# Patient Record
Sex: FEMALE | Race: OTHER | HISPANIC OR LATINO | URBAN - METROPOLITAN AREA
[De-identification: names, ages, dates, MRNs, and addresses within clinical notes are randomized per-mention and may not be internally consistent; named-entity substitution may affect disease eponyms.]

---

## 2022-06-10 ENCOUNTER — TELEPHONE (OUTPATIENT)
Dept: FAMILY MEDICINE CLINIC | Facility: CLINIC | Age: 52
End: 2022-06-10

## 2022-06-27 NOTE — TELEPHONE ENCOUNTER
06/27/22 2:51 PM        Thank you for your request  Your request has been received, reviewed, and the patient chart updated  The PCP has successfully been removed with a patient attribution note  This message will now be completed          Thank you  Ciara Mckeon

## 2022-06-30 ENCOUNTER — APPOINTMENT (OUTPATIENT)
Dept: URGENT CARE | Age: 52
End: 2022-06-30

## 2022-12-31 ENCOUNTER — HOSPITAL ENCOUNTER (EMERGENCY)
Facility: HOSPITAL | Age: 52
Discharge: HOME | End: 2023-01-01
Attending: EMERGENCY MEDICINE

## 2022-12-31 VITALS
DIASTOLIC BLOOD PRESSURE: 77 MMHG | HEART RATE: 108 BPM | WEIGHT: 172 LBS | TEMPERATURE: 99.6 F | SYSTOLIC BLOOD PRESSURE: 125 MMHG | BODY MASS INDEX: 34.74 KG/M2 | OXYGEN SATURATION: 97 % | RESPIRATION RATE: 18 BRPM

## 2022-12-31 DIAGNOSIS — R11.10 VOMITING: ICD-10-CM

## 2022-12-31 DIAGNOSIS — R10.13 EPIGASTRIC PAIN: Primary | ICD-10-CM

## 2022-12-31 LAB
ALBUMIN SERPL BCP-MCNC: 3.6 G/DL (ref 3.5–5)
ALP SERPL-CCNC: 87 U/L (ref 46–116)
ALT SERPL W P-5'-P-CCNC: 35 U/L (ref 12–78)
ANION GAP SERPL CALCULATED.3IONS-SCNC: 7 MMOL/L (ref 4–13)
AST SERPL W P-5'-P-CCNC: 17 U/L (ref 5–45)
BASOPHILS # BLD AUTO: 0.01 THOUSANDS/ÂΜL (ref 0–0.1)
BASOPHILS NFR BLD AUTO: 0 % (ref 0–1)
BILIRUB SERPL-MCNC: 0.44 MG/DL (ref 0.2–1)
BUN SERPL-MCNC: 12 MG/DL (ref 5–25)
CALCIUM SERPL-MCNC: 8.9 MG/DL (ref 8.3–10.1)
CHLORIDE SERPL-SCNC: 105 MMOL/L (ref 96–108)
CO2 SERPL-SCNC: 25 MMOL/L (ref 21–32)
CREAT SERPL-MCNC: 0.62 MG/DL (ref 0.6–1.3)
EOSINOPHIL # BLD AUTO: 0 THOUSAND/ÂΜL (ref 0–0.61)
EOSINOPHIL NFR BLD AUTO: 0 % (ref 0–6)
ERYTHROCYTE [DISTWIDTH] IN BLOOD BY AUTOMATED COUNT: 13.2 % (ref 11.6–15.1)
GFR SERPL CREATININE-BSD FRML MDRD: 104 ML/MIN/1.73SQ M
GLUCOSE SERPL-MCNC: 143 MG/DL (ref 65–140)
HCT VFR BLD AUTO: 40.9 % (ref 34.8–46.1)
HGB BLD-MCNC: 13.6 G/DL (ref 11.5–15.4)
IMM GRANULOCYTES # BLD AUTO: 0.02 THOUSAND/UL (ref 0–0.2)
IMM GRANULOCYTES NFR BLD AUTO: 0 % (ref 0–2)
LIPASE SERPL-CCNC: 65 U/L (ref 73–393)
LYMPHOCYTES # BLD AUTO: 0.59 THOUSANDS/ÂΜL (ref 0.6–4.47)
LYMPHOCYTES NFR BLD AUTO: 7 % (ref 14–44)
MCH RBC QN AUTO: 28.5 PG (ref 26.8–34.3)
MCHC RBC AUTO-ENTMCNC: 33.3 G/DL (ref 31.4–37.4)
MCV RBC AUTO: 86 FL (ref 82–98)
MONOCYTES # BLD AUTO: 0.61 THOUSAND/ÂΜL (ref 0.17–1.22)
MONOCYTES NFR BLD AUTO: 7 % (ref 4–12)
NEUTROPHILS # BLD AUTO: 7.86 THOUSANDS/ÂΜL (ref 1.85–7.62)
NEUTS SEG NFR BLD AUTO: 86 % (ref 43–75)
NRBC BLD AUTO-RTO: 0 /100 WBCS
PLATELET # BLD AUTO: 346 THOUSANDS/UL (ref 149–390)
PMV BLD AUTO: 10.3 FL (ref 8.9–12.7)
POTASSIUM SERPL-SCNC: 3.5 MMOL/L (ref 3.5–5.3)
PROT SERPL-MCNC: 7.5 G/DL (ref 6.4–8.4)
RBC # BLD AUTO: 4.78 MILLION/UL (ref 3.81–5.12)
SODIUM SERPL-SCNC: 137 MMOL/L (ref 135–147)
WBC # BLD AUTO: 9.09 THOUSAND/UL (ref 4.31–10.16)

## 2022-12-31 RX ORDER — SUCRALFATE 1 G/1
1 TABLET ORAL ONCE
Status: COMPLETED | OUTPATIENT
Start: 2022-12-31 | End: 2022-12-31

## 2022-12-31 RX ORDER — ONDANSETRON 2 MG/ML
4 INJECTION INTRAMUSCULAR; INTRAVENOUS ONCE
Status: COMPLETED | OUTPATIENT
Start: 2022-12-31 | End: 2022-12-31

## 2022-12-31 RX ORDER — ONDANSETRON 4 MG/1
4 TABLET, FILM COATED ORAL EVERY 6 HOURS
Qty: 12 TABLET | Refills: 0 | Status: SHIPPED | OUTPATIENT
Start: 2022-12-31

## 2022-12-31 RX ORDER — KETOROLAC TROMETHAMINE 30 MG/ML
15 INJECTION, SOLUTION INTRAMUSCULAR; INTRAVENOUS ONCE
Status: COMPLETED | OUTPATIENT
Start: 2022-12-31 | End: 2022-12-31

## 2022-12-31 RX ADMIN — SUCRALFATE 1 G: 1 TABLET ORAL at 23:29

## 2022-12-31 RX ADMIN — SODIUM CHLORIDE, SODIUM LACTATE, POTASSIUM CHLORIDE, AND CALCIUM CHLORIDE 1000 ML: .6; .31; .03; .02 INJECTION, SOLUTION INTRAVENOUS at 22:25

## 2022-12-31 RX ADMIN — ONDANSETRON 4 MG: 2 INJECTION INTRAMUSCULAR; INTRAVENOUS at 22:25

## 2022-12-31 RX ADMIN — KETOROLAC TROMETHAMINE 15 MG: 30 INJECTION, SOLUTION INTRAMUSCULAR; INTRAVENOUS at 22:25

## 2022-12-31 NOTE — Clinical Note
Reta Lombard was seen and treated in our emergency department on 12/31/2022  No restrictions        No limitations    Diagnosis: Vomiting    Dom Bradley  may return to work on return date  She may return on this date: 01/02/2023         If you have any questions or concerns, please don't hesitate to call        Renetta Ho MD    ______________________________           _______________          _______________  [facility] Representative                              Date                                Time

## 2023-01-01 LAB
ATRIAL RATE: 101 BPM
P AXIS: 62 DEGREES
PR INTERVAL: 138 MS
QRS AXIS: 41 DEGREES
QRSD INTERVAL: 70 MS
QT INTERVAL: 330 MS
QTC INTERVAL: 427 MS
T WAVE AXIS: 55 DEGREES
VENTRICULAR RATE: 101 BPM

## 2023-01-01 NOTE — DISCHARGE INSTRUCTIONS
You were seen for abdominal pain and vomiting  You likely have a viral infection causing inflammation of your stomach  You felt better after medicines  You can  the prescribed nausea medicine from the pharmacy if needed

## 2023-01-01 NOTE — ED ATTENDING ATTESTATION
12/31/2022  IKacy MD, saw and evaluated the patient  I have discussed the patient with the resident/non-physician practitioner and agree with the resident's/non-physician practitioner's findings, Plan of Care, and MDM as documented in the resident's/non-physician practitioner's note, except where noted  All available labs and Radiology studies were reviewed  I was present for key portions of any procedure(s) performed by the resident/non-physician practitioner and I was immediately available to provide assistance  At this point I agree with the current assessment done in the Emergency Department  I have conducted an independent evaluation of this patient a history and physical is as follows:    ED Course     12-year-old female presents with epigastric abdominal pain nausea vomiting headaches  Patient states she had 4 episodes of vomiting  Pain localized left upper quadrant epigastric  Presents for upper quadrant pain denies lower abdominal pain  Denies any urinary symptoms  Vitals reviewed  Patient well-appearing nontoxic no acute distress heart rate and rhythm lungs clear to station laterally abdomen soft with epigastric left upper quadrant tenderness palpation no guarding or rebound noted  No signs of peritonitis  No CVA tenderness      Impression upper abdominal pain nausea vomiting plan trial of GI cocktail check screening labs IV fluids pain control reassess    Critical Care Time  Procedures

## 2023-01-01 NOTE — ED PROVIDER NOTES
History  Chief Complaint   Patient presents with   • Weakness - Generalized     Pt c/o weakness, vomiting, HA, and pain since last night     70-year-old woman with no relevant past medical history presents with vomiting, headache, epigastric pain, and left upper quadrant pain  Patient reports symptoms began yesterday evening and progressively worsened  She has had 4 episodes of vomiting now bilious without blood  She feels like she is unable to keep anything down  Last vomited 2 hours prior to arrival   Pain is in the epigastric left upper quadrant area and travels around into the back  She denies right upper quadrant pain  She is having some chest discomfort but no dyspnea or pleuritic pain  No diarrhea or dysuria  Past medical history: Allergies  Past surgical history: Hysterectomy          None       History reviewed  No pertinent past medical history  History reviewed  No pertinent surgical history  History reviewed  No pertinent family history  I have reviewed and agree with the history as documented  E-Cigarette/Vaping   • E-Cigarette Use Never User      E-Cigarette/Vaping Substances     Social History     Tobacco Use   • Smoking status: Never   • Smokeless tobacco: Never   Vaping Use   • Vaping Use: Never used   Substance Use Topics   • Alcohol use: Never   • Drug use: Never        Review of Systems   Constitutional: Negative for chills and fever  HENT: Negative for ear pain and sore throat  Eyes: Negative for pain and visual disturbance  Respiratory: Negative for cough, shortness of breath and wheezing  Cardiovascular: Negative for palpitations and leg swelling  Gastrointestinal: Positive for abdominal pain and vomiting  Negative for constipation and diarrhea  Genitourinary: Negative for dysuria, frequency, hematuria and vaginal bleeding  Musculoskeletal: Negative for arthralgias and back pain  Skin: Negative for color change and rash     Neurological: Positive for headaches  Negative for seizures and syncope  Psychiatric/Behavioral: Negative for agitation and confusion  Physical Exam  ED Triage Vitals   Temperature Pulse Respirations Blood Pressure SpO2   12/31/22 2147 12/31/22 2147 12/31/22 2147 12/31/22 2147 12/31/22 2147   99 6 °F (37 6 °C) (!) 108 18 125/77 97 %      Temp Source Heart Rate Source Patient Position - Orthostatic VS BP Location FiO2 (%)   12/31/22 2147 12/31/22 2147 12/31/22 2147 12/31/22 2147 --   Tympanic Monitor Sitting Left arm       Pain Score       12/31/22 2153       6             Orthostatic Vital Signs  Vitals:    12/31/22 2147   BP: 125/77   Pulse: (!) 108   Patient Position - Orthostatic VS: Sitting       Physical Exam  Vitals and nursing note reviewed  Constitutional:       General: She is not in acute distress  Appearance: Normal appearance  She is well-developed  HENT:      Head: Normocephalic and atraumatic  Right Ear: External ear normal       Left Ear: External ear normal       Nose: Nose normal  No congestion  Cardiovascular:      Rate and Rhythm: Regular rhythm  Tachycardia present  Pulmonary:      Effort: Pulmonary effort is normal  No respiratory distress  Breath sounds: Normal breath sounds  Abdominal:      Palpations: Abdomen is soft  Tenderness: There is abdominal tenderness in the epigastric area and left upper quadrant  There is no guarding or rebound  Musculoskeletal:         General: Normal range of motion  Cervical back: Normal range of motion and neck supple  Skin:     General: Skin is warm and dry  Neurological:      General: No focal deficit present  Mental Status: She is alert and oriented to person, place, and time     Psychiatric:         Mood and Affect: Mood normal          Behavior: Behavior normal          ED Medications  Medications   lactated ringers bolus 1,000 mL (0 mL Intravenous Stopped 12/31/22 2325)   ondansetron (ZOFRAN) injection 4 mg (4 mg Intravenous Given 12/31/22 2225)   ketorolac (TORADOL) injection 15 mg (15 mg Intravenous Given 12/31/22 2225)   sucralfate (CARAFATE) tablet 1 g (1 g Oral Given 12/31/22 2329)       Diagnostic Studies  Results Reviewed     Procedure Component Value Units Date/Time    Comprehensive metabolic panel [[id number]]  (Abnormal) Collected: 12/31/22 2232    Lab Status: Final result Specimen: Blood from Line, Venous Updated: 12/31/22 2257     Sodium 137 mmol/L      Potassium 3 5 mmol/L      Chloride 105 mmol/L      CO2 25 mmol/L      ANION GAP 7 mmol/L      BUN 12 mg/dL      Creatinine 0 62 mg/dL      Glucose 143 mg/dL      Calcium 8 9 mg/dL      AST 17 U/L      ALT 35 U/L      Alkaline Phosphatase 87 U/L      Total Protein 7 5 g/dL      Albumin 3 6 g/dL      Total Bilirubin 0 44 mg/dL      eGFR 104 ml/min/1 73sq m     Narrative:      [place] guidelines for Chronic Kidney Disease (CKD):   •  Stage 1 with normal or high GFR (GFR > 90 mL/min/1 73 square meters)  •  Stage 2 Mild CKD (GFR = 60-89 mL/min/1 73 square meters)  •  Stage 3A Moderate CKD (GFR = 45-59 mL/min/1 73 square meters)  •  Stage 3B Moderate CKD (GFR = 30-44 mL/min/1 73 square meters)  •  Stage 4 Severe CKD (GFR = 15-29 mL/min/1 73 square meters)  •  Stage 5 End Stage CKD (GFR <15 mL/min/1 73 square meters)  Note: GFR calculation is accurate only with a steady state creatinine    Lipase [[id number]]  (Abnormal) Collected: 12/31/22 2232    Lab Status: Final result Specimen: Blood from Line, Venous Updated: 12/31/22 2257     Lipase 65 u/L     CBC and differential [[id number]]  (Abnormal) Collected: 12/31/22 2232    Lab Status: Final result Specimen: Blood from Line, Venous Updated: 12/31/22 2244     WBC 9 09 Thousand/uL      RBC 4 78 Million/uL      Hemoglobin 13 6 g/dL      Hematocrit 40 9 %      MCV 86 fL      MCH 28 5 pg      MCHC 33 3 g/dL      RDW 13 2 %      MPV 10 3 fL      Platelets 025 Thousands/uL      nRBC 0 /100 WBCs      Neutrophils Relative 86 %      Immat GRANS % 0 %      Lymphocytes Relative 7 %      Monocytes Relative 7 %      Eosinophils Relative 0 %      Basophils Relative 0 %      Neutrophils Absolute 7 86 Thousands/µL      Immature Grans Absolute 0 02 Thousand/uL      Lymphocytes Absolute 0 59 Thousands/µL      Monocytes Absolute 0 61 Thousand/µL      Eosinophils Absolute 0 00 Thousand/µL      Basophils Absolute 0 01 Thousands/µL                  No orders to display         Procedures  Procedures      ED Course         SBIRT 22yo+    Flowsheet Row Most Recent Value   SBIRT (25 yo +)    In order to provide better care to our patients, we are screening all of our patients for alcohol and drug use  Would it be okay to ask you these screening questions? Yes Filed at: 12/31/2022 2306   Initial Alcohol Screen: US AUDIT-C     1  How often do you have a drink containing alcohol? 1 Filed at: 12/31/2022 2306   2  How many drinks containing alcohol do you have on a typical day you are drinking? 0 Filed at: 12/31/2022 2306   3a  Male UNDER 65: How often do you have five or more drinks on one occasion? 0 Filed at: 12/31/2022 2306   3b  FEMALE Any Age, or MALE 65+: How often do you have 4 or more drinks on one occassion? 0 Filed at: 12/31/2022 2306   Audit-C Score 1 Filed at: 12/31/2022 2306   FANY: How many times in the past year have you    Used an illegal drug or used a prescription medication for non-medical reasons? Never Filed at: 12/31/2022 2306                MDM  Number of Diagnoses or Management Options  Epigastric pain  Vomiting  Diagnosis management comments: Presents with headache, vomiting, and epigastric pain  Patient otherwise well-appearing aside from tachycardia  Will order CBC, CMP, lipase, fluids, and GI cocktail to evaluate for pancreatitis, hyponatremia, or elevated liver enzymes  Patient does not have right upper quadrant pain, so I am not suspicious for biliary disease  Labs relatively unremarkable    Patient feeling better after GI cocktail and fluids  Will discharge home with presumed gastritis  Patient in agreement with plan and questions were answered  Verbalized understanding of return precautions  Portions or all of this note were generated using voice recognition software  Occasional wrong word or "sound a like" substitutions may have occurred due to the inherent limitations of voice recognition software  Please interpret any errors within the intended context of the whole sentence or idea  Disposition  Final diagnoses:   Epigastric pain   Vomiting     Time reflects when diagnosis was documented in both MDM as applicable and the Disposition within this note     Time User Action Codes Description Comment    12/31/2022 11:23 PM Amye Round Add [R10 13] Epigastric pain     12/31/2022 11:23 PM Amye Round Add [R11 10] Vomiting       ED Disposition     ED Disposition   Discharge    Condition   Stable    Date/Time   Sat Dec 31, 2022 11:23 PM    Comment   Parvez Johnson discharge to home/self care  Follow-up Information     Follow up With Specialties Details Why Contact Info Additional [address] Emergency Department Emergency Medicine Go to  As needed, If symptoms worsen [address] [id number]  [address] Emergency Department, [address], [address]          Discharge Medication List as of 12/31/2022 11:43 PM      START taking these medications    Details   ondansetron (ZOFRAN) 4 mg tablet Take 1 tablet (4 mg total) by mouth every 6 (six) hours, Starting Sat 12/31/2022, Normal           No discharge procedures on file  PDMP Review     None           ED Provider  Attending physically available and evaluated Parvez Johnson I managed the patient along with the ED Attending      Electronically Signed by         Cristina Chong MD  01/01/23 0179

## 2024-04-04 ENCOUNTER — APPOINTMENT (EMERGENCY)
Dept: RADIOLOGY | Facility: HOSPITAL | Age: 54
End: 2024-04-04
Payer: COMMERCIAL

## 2024-04-04 ENCOUNTER — HOSPITAL ENCOUNTER (EMERGENCY)
Facility: HOSPITAL | Age: 54
Discharge: HOME | End: 2024-04-04
Attending: EMERGENCY MEDICINE
Payer: COMMERCIAL

## 2024-04-04 VITALS
BODY MASS INDEX: 34.27 KG/M2 | HEART RATE: 97 BPM | WEIGHT: 170 LBS | OXYGEN SATURATION: 100 % | SYSTOLIC BLOOD PRESSURE: 164 MMHG | TEMPERATURE: 97.3 F | HEIGHT: 59 IN | RESPIRATION RATE: 20 BRPM | DIASTOLIC BLOOD PRESSURE: 91 MMHG

## 2024-04-04 DIAGNOSIS — R07.9 CHEST PAIN: Primary | ICD-10-CM

## 2024-04-04 LAB
ALBUMIN SERPL BCP-MCNC: 4.3 G/DL (ref 3.5–5)
ALP SERPL-CCNC: 76 U/L (ref 34–104)
ALT SERPL W P-5'-P-CCNC: 19 U/L (ref 7–52)
ANION GAP SERPL CALCULATED.3IONS-SCNC: 8 MMOL/L (ref 4–13)
AST SERPL W P-5'-P-CCNC: 15 U/L (ref 13–39)
ATRIAL RATE: 88 BPM
BASOPHILS # BLD AUTO: 0.05 THOUSANDS/ÂΜL (ref 0–0.1)
BASOPHILS NFR BLD AUTO: 1 % (ref 0–1)
BILIRUB SERPL-MCNC: 0.36 MG/DL (ref 0.2–1)
BUN SERPL-MCNC: 11 MG/DL (ref 5–25)
CALCIUM SERPL-MCNC: 9.1 MG/DL (ref 8.4–10.2)
CARDIAC TROPONIN I PNL SERPL HS: <2 NG/L
CHLORIDE SERPL-SCNC: 102 MMOL/L (ref 96–108)
CO2 SERPL-SCNC: 29 MMOL/L (ref 21–32)
CREAT SERPL-MCNC: 0.63 MG/DL (ref 0.6–1.3)
EOSINOPHIL # BLD AUTO: 0.11 THOUSAND/ÂΜL (ref 0–0.61)
EOSINOPHIL NFR BLD AUTO: 1 % (ref 0–6)
ERYTHROCYTE [DISTWIDTH] IN BLOOD BY AUTOMATED COUNT: 13.5 % (ref 11.6–15.1)
GFR SERPL CREATININE-BSD FRML MDRD: 102 ML/MIN/1.73SQ M
GLUCOSE SERPL-MCNC: 129 MG/DL (ref 65–140)
HCT VFR BLD AUTO: 39.1 % (ref 34.8–46.1)
HGB BLD-MCNC: 12.7 G/DL (ref 11.5–15.4)
IMM GRANULOCYTES # BLD AUTO: 0.03 THOUSAND/UL (ref 0–0.2)
IMM GRANULOCYTES NFR BLD AUTO: 0 % (ref 0–2)
LYMPHOCYTES # BLD AUTO: 4.33 THOUSANDS/ÂΜL (ref 0.6–4.47)
LYMPHOCYTES NFR BLD AUTO: 42 % (ref 14–44)
MCH RBC QN AUTO: 28.9 PG (ref 26.8–34.3)
MCHC RBC AUTO-ENTMCNC: 32.5 G/DL (ref 31.4–37.4)
MCV RBC AUTO: 89 FL (ref 82–98)
MONOCYTES # BLD AUTO: 0.74 THOUSAND/ÂΜL (ref 0.17–1.22)
MONOCYTES NFR BLD AUTO: 7 % (ref 4–12)
NEUTROPHILS # BLD AUTO: 5.16 THOUSANDS/ÂΜL (ref 1.85–7.62)
NEUTS SEG NFR BLD AUTO: 49 % (ref 43–75)
NRBC BLD AUTO-RTO: 0 /100 WBCS
P AXIS: 56 DEGREES
PLATELET # BLD AUTO: 359 THOUSANDS/UL (ref 149–390)
PMV BLD AUTO: 9.7 FL (ref 8.9–12.7)
POTASSIUM SERPL-SCNC: 3.4 MMOL/L (ref 3.5–5.3)
PR INTERVAL: 150 MS
PROT SERPL-MCNC: 7.3 G/DL (ref 6.4–8.4)
QRS AXIS: 58 DEGREES
QRSD INTERVAL: 80 MS
QT INTERVAL: 364 MS
QTC INTERVAL: 440 MS
RBC # BLD AUTO: 4.4 MILLION/UL (ref 3.81–5.12)
SODIUM SERPL-SCNC: 139 MMOL/L (ref 135–147)
T WAVE AXIS: 57 DEGREES
VENTRICULAR RATE: 88 BPM
WBC # BLD AUTO: 10.42 THOUSAND/UL (ref 4.31–10.16)

## 2024-04-04 PROCEDURE — 85025 COMPLETE CBC W/AUTO DIFF WBC: CPT | Performed by: EMERGENCY MEDICINE

## 2024-04-04 PROCEDURE — 99285 EMERGENCY DEPT VISIT HI MDM: CPT | Performed by: EMERGENCY MEDICINE

## 2024-04-04 PROCEDURE — 96372 THER/PROPH/DIAG INJ SC/IM: CPT

## 2024-04-04 PROCEDURE — 71045 X-RAY EXAM CHEST 1 VIEW: CPT

## 2024-04-04 PROCEDURE — 84484 ASSAY OF TROPONIN QUANT: CPT | Performed by: EMERGENCY MEDICINE

## 2024-04-04 PROCEDURE — 99285 EMERGENCY DEPT VISIT HI MDM: CPT

## 2024-04-04 PROCEDURE — 93005 ELECTROCARDIOGRAM TRACING: CPT

## 2024-04-04 PROCEDURE — 80053 COMPREHEN METABOLIC PANEL: CPT | Performed by: EMERGENCY MEDICINE

## 2024-04-04 PROCEDURE — 36415 COLL VENOUS BLD VENIPUNCTURE: CPT

## 2024-04-04 PROCEDURE — 93010 ELECTROCARDIOGRAM REPORT: CPT | Performed by: INTERNAL MEDICINE

## 2024-04-04 RX ORDER — LIDOCAINE 50 MG/G
1 PATCH TOPICAL ONCE
Status: DISCONTINUED | OUTPATIENT
Start: 2024-04-04 | End: 2024-04-04 | Stop reason: HOSPADM

## 2024-04-04 RX ORDER — KETOROLAC TROMETHAMINE 30 MG/ML
15 INJECTION, SOLUTION INTRAMUSCULAR; INTRAVENOUS ONCE
Status: COMPLETED | OUTPATIENT
Start: 2024-04-04 | End: 2024-04-04

## 2024-04-04 RX ORDER — ACETAMINOPHEN 325 MG/1
650 TABLET ORAL ONCE
Status: COMPLETED | OUTPATIENT
Start: 2024-04-04 | End: 2024-04-04

## 2024-04-04 RX ORDER — KETOROLAC TROMETHAMINE 30 MG/ML
15 INJECTION, SOLUTION INTRAMUSCULAR; INTRAVENOUS ONCE
Status: DISCONTINUED | OUTPATIENT
Start: 2024-04-04 | End: 2024-04-04

## 2024-04-04 RX ADMIN — LIDOCAINE 5% 1 PATCH: 700 PATCH TOPICAL at 15:43

## 2024-04-04 RX ADMIN — ACETAMINOPHEN 650 MG: 325 TABLET, FILM COATED ORAL at 15:45

## 2024-04-04 RX ADMIN — KETOROLAC TROMETHAMINE 15 MG: 30 INJECTION, SOLUTION INTRAMUSCULAR; INTRAVENOUS at 15:45

## 2024-04-04 NOTE — ED ATTENDING ATTESTATION
4/4/2024  I, Delroy Nance DO, saw and evaluated the patient. I have discussed the patient with the resident/non-physician practitioner and agree with the resident's/non-physician practitioner's findings, Plan of Care, and MDM as documented in the resident's/non-physician practitioner's note, except where noted. All available labs and Radiology studies were reviewed.  I was present for key portions of any procedure(s) performed by the resident/non-physician practitioner and I was immediately available to provide assistance.       At this point I agree with the current assessment done in the Emergency Department.  I have conducted an independent evaluation of this patient a history and physical is as follows:    Patient is a 53-year-old female history of hypothyroidism, asthma, says about 2 days ago she been having some episodic chest discomfort, more in the center part of the chest and elbow on the left-hand side, last an hour or so and then goes away, feels like somewhat pressure,.  Worse when she lays flat, no fever, no chills, no myalgias or arthralgias.  No recent prolonged travel, hospitalizations, immobilizations or inactivity periods.  No personal or family history of DVT or PE.  Is not pleuritic in nature, is not knifelike, ripping, or tearing.  No associated nausea, vomiting or diaphoresis.  No exertional component.  No fall or trauma.  She says it feels somewhat similar to when she had prior chest pain several months ago.  She was seen and evaluated January 11, 2024 at an outside emergency department, per review of their records, she had an unremarkable cardiac workup, D-dimer was elevated so she had a CT chest PE study which showed no acute PE.  It was recommended that she follow-up with her primary care physician, she said she did not realize she was supposed to follow-up so she has not done so.  She says this feels similar to the discomfort she had then.    General:  Patient is well-appearing  Head:   Atraumatic  Eyes:  Conjunctiva pink  ENT:  Mucous membranes are moist  Neck:  Supple  Cardiac:  S1-S2, without murmurs  Lungs:  Clear to auscultation bilaterally  Abdomen:  Soft, nontender, normal bowel sounds, no CVA tenderness, no tympany, no rigidity, no guarding  Extremities:  Normal range of motion, no pedal edema or calf asymmetry, radial pulses are equal and symmetric bilaterally  Neurologic:  Awake, fluent speech, normal comprehension, AAOx3  Skin:  Pink warm and dry  Psychiatric:  Alert, pleasant, cooperative      ED Course  ED Course as of 04/04/24 1722   Thu Apr 04, 2024   1528 ECG interpreted by me, sinus rhythm, rate of 88, no acute ischemic or infarctive changes, no change from December 31, 2022     Labs Reviewed   CBC AND DIFFERENTIAL - Abnormal       Result Value Ref Range Status    WBC 10.42 (*) 4.31 - 10.16 Thousand/uL Final    RBC 4.40  3.81 - 5.12 Million/uL Final    Hemoglobin 12.7  11.5 - 15.4 g/dL Final    Hematocrit 39.1  34.8 - 46.1 % Final    MCV 89  82 - 98 fL Final    MCH 28.9  26.8 - 34.3 pg Final    MCHC 32.5  31.4 - 37.4 g/dL Final    RDW 13.5  11.6 - 15.1 % Final    MPV 9.7  8.9 - 12.7 fL Final    Platelets 359  149 - 390 Thousands/uL Final    nRBC 0  /100 WBCs Final    Neutrophils Relative 49  43 - 75 % Final    Immature Grans % 0  0 - 2 % Final    Lymphocytes Relative 42  14 - 44 % Final    Monocytes Relative 7  4 - 12 % Final    Eosinophils Relative 1  0 - 6 % Final    Basophils Relative 1  0 - 1 % Final    Neutrophils Absolute 5.16  1.85 - 7.62 Thousands/µL Final    Absolute Immature Grans 0.03  0.00 - 0.20 Thousand/uL Final    Absolute Lymphocytes 4.33  0.60 - 4.47 Thousands/µL Final    Absolute Monocytes 0.74  0.17 - 1.22 Thousand/µL Final    Eosinophils Absolute 0.11  0.00 - 0.61 Thousand/µL Final    Basophils Absolute 0.05  0.00 - 0.10 Thousands/µL Final   COMPREHENSIVE METABOLIC PANEL - Abnormal    Sodium 139  135 - 147 mmol/L Final    Potassium 3.4 (*) 3.5 - 5.3 mmol/L  "Final    Chloride 102  96 - 108 mmol/L Final    CO2 29  21 - 32 mmol/L Final    ANION GAP 8  4 - 13 mmol/L Final    BUN 11  5 - 25 mg/dL Final    Creatinine 0.63  0.60 - 1.30 mg/dL Final    Comment: Standardized to IDMS reference method    Glucose 129  65 - 140 mg/dL Final    Comment: If the patient is fasting, the ADA then defines impaired fasting glucose as > 100 mg/dL and diabetes as > or equal to 123 mg/dL.    Calcium 9.1  8.4 - 10.2 mg/dL Final    AST 15  13 - 39 U/L Final    ALT 19  7 - 52 U/L Final    Comment: Specimen collection should occur prior to Sulfasalazine administration due to the potential for falsely depressed results.     Alkaline Phosphatase 76  34 - 104 U/L Final    Total Protein 7.3  6.4 - 8.4 g/dL Final    Albumin 4.3  3.5 - 5.0 g/dL Final    Total Bilirubin 0.36  0.20 - 1.00 mg/dL Final    Comment: Use of this assay is not recommended for patients undergoing treatment with eltrombopag due to the potential for falsely elevated results.  N-acetyl-p-benzoquinone imine (metabolite of Acetaminophen) will generate erroneously low results in samples for patients that have taken an overdose of Acetaminophen.    eGFR 102  ml/min/1.73sq m Final    Narrative:     [employer] guidelines for Chronic Kidney Disease (CKD):     Stage 1 with normal or high GFR (GFR > 90 mL/min/1.73 square meters)    Stage 2 Mild CKD (GFR = 60-89 mL/min/1.73 square meters)    Stage 3A Moderate CKD (GFR = 45-59 mL/min/1.73 square meters)    Stage 3B Moderate CKD (GFR = 30-44 mL/min/1.73 square meters)    Stage 4 Severe CKD (GFR = 15-29 mL/min/1.73 square meters)    Stage 5 End Stage CKD (GFR <15 mL/min/1.73 square meters)  Note: GFR calculation is accurate only with a steady state creatinine   HS TROPONIN I 0HR - Normal    hs TnI 0hr <2  \"Refer to ACS Flowchart\"- see link ng/L Final    Comment:                                              Initial (time 0) result  If >=50 ng/L, Myocardial injury suggested " ;  Type of myocardial injury and treatment strategy  to be determined.  If 5-49 ng/L, a delta result at 2 hours and or 4 hours will be needed to further evaluate.  If <4 ng/L, and chest pain has been >3 hours since onset, patient may qualify for discharge based on the HEART score in the ED.  If <5 ng/L and <3hours since onset of chest pain, a delta result at 2 hours will be needed to further evaluate.    HS Troponin 99th Percentile URL of a Health Population=12 ng/L with a 95% Confidence Interval of 8-18 ng/L.    Second Troponin (time 2 hours)  If calculated delta >= 20 ng/L,  Myocardial injury suggested ; Type of myocardial injury and treatment strategy to be determined.  If 5-49 ng/L and the calculated delta is 5-19 ng/L, consult medical service for evaluation.  Continue evaluation for ischemia on ecg and other possible etiology and repeat hs troponin at 4 hours.  If delta is <5 ng/L at 2 hours, consider discharge based on risk stratification via the HEART score (if in ED), or SAHIL risk score in IP/Observation.    HS Troponin 99th Percentile URL of a Health Population=12 ng/L with a 95% Confidence Interval of 8-18 ng/L.       XR chest 1 view portable   Final Result      No acute cardiopulmonary disease.               Workstation performed: [id number]           On reassessment patient is feeling more comfortable.  At this point the cause the patient's symptoms is unclear but unlikely to represent an acute emergency.  She has no evidence of life-threatening acute coronary syndrome, no evidence of significant metabolic derangement,I do not believe this patient's complaints are from pulmonary embolism and I believe they would most likely be harmed through false positive test results and other complications of testing by further pursuing the diagnosis of pulmonary embolism.  Do not believe this is pericarditis, or pneumothorax.While the cause of the patient's complaints is most likely benign, it is possible that this is  the early presentation of a more serious condition. This diagnostic uncertainty was discussed with the patient, as was the importance of follow up care, as well as the need to return to immediately return to the closest emergency department for the signs/symptoms in the discharge instruction sheets, or they were otherwise concerned about their medical condition. The patient stated they were aware of this diagnostic uncertainty, understood the importance of follow up and were comfortable being discharged.  Supportive care, importance of follow-up and return precautions were discussed with the patient, who expressed understanding.    MEDICAL DECISION MAKING CODING    Patient presents with acute new problem with:  Threat to life or bodily function    Chronic conditions affecting care: As per [facility]    COLLECTION AND INTERPRETATION OF DATA  I reviewed prior external notes, including previous ECG as noted above, prior January 11, 2024 CT and labs as noted above    I ordered each unique test  Tests reviewed personally by me:  ECG: See my ED course  Labs: See above  Imaging: I independently reviewed the chest x-ray and found no acute pathology.    Tests considered but not ordered: See above    RISK  All of the patient's current prescription medications should be continued.    Treatment:  Consideration of admission: Admission considered but given [facility] protocol regarding chest pain with unremarkable troponin and greater than 2 hours of symptoms,, will discharge patient    Surgery  -I considered surgery may be necessary prior to completion of the work up but afterwards there is no indication for immediate surgery          Critical Care Time  Procedures

## 2024-04-04 NOTE — ED PROVIDER NOTES
History  Chief Complaint   Patient presents with    Chest Pain     Per pt she is having chest pressure and mid back pain x2 days.     HPI  Patient is 53-year-old female presenting for concerns of intermittent sharp chest pain and back pain that has been going on for 2 days.  No seen past medical history, no cardiac history, no recent travel recent surgeries, no history of blood clots.  Patient states she had a similar episode about 2 months ago, workup at that time was negative which included CT PE study.  Patient currently endorses similar symptoms which include sharp substernal chest pain and left mid back pain, pain is pleuritic in nature worse with deep inspiration, denies any lightheadedness or dizziness or shortness of breath.  Patient denies any abdominal pain.  Pain is not worsened with physical activity.  Prior to Admission Medications   Prescriptions Last Dose Informant Patient Reported? Taking?   ondansetron (ZOFRAN) 4 mg tablet   No No   Sig: Take 1 tablet (4 mg total) by mouth every 6 (six) hours      Facility-Administered Medications: None       No past medical history on file.    No past surgical history on file.    No family history on file.  I have reviewed and agree with the history as documented.    E-Cigarette/Vaping    E-Cigarette Use Never User      E-Cigarette/Vaping Substances     Social History     Tobacco Use    Smoking status: Never    Smokeless tobacco: Never   Vaping Use    Vaping status: Never Used   Substance Use Topics    Alcohol use: Never    Drug use: Never        Review of Systems   Constitutional: Negative.    HENT: Negative.     Eyes: Negative.    Respiratory: Negative.     Cardiovascular:  Positive for chest pain.   Gastrointestinal: Negative.    Endocrine: Negative.    Genitourinary: Negative.    Musculoskeletal:  Positive for back pain.   Skin: Negative.    Allergic/Immunologic: Negative.    Neurological: Negative.    Hematological: Negative.    Psychiatric/Behavioral:  Negative.     All other systems reviewed and are negative.      Physical Exam  ED Triage Vitals   Temperature Pulse Respirations Blood Pressure SpO2   04/04/24 1439 04/04/24 1439 04/04/24 1439 04/04/24 1440 04/04/24 1439   (!) 97.3 °F (36.3 °C) 97 20 164/91 100 %      Temp Source Heart Rate Source Patient Position - Orthostatic VS BP Location FiO2 (%)   04/04/24 1439 04/04/24 1439 04/04/24 1440 04/04/24 1440 --   Temporal Monitor Lying Left arm       Pain Score       04/04/24 1439       5             Orthostatic Vital Signs  Vitals:    04/04/24 1439 04/04/24 1440   BP:  164/91   Pulse: 97    Patient Position - Orthostatic VS:  Lying       Physical Exam  Vitals and nursing note reviewed.   Constitutional:       Appearance: Normal appearance. She is normal weight.   HENT:      Head: Normocephalic and atraumatic.      Right Ear: Tympanic membrane, ear canal and external ear normal.      Left Ear: Tympanic membrane, ear canal and external ear normal.      Nose: Nose normal.      Mouth/Throat:      Mouth: Mucous membranes are moist.      Pharynx: Oropharynx is clear.   Eyes:      Extraocular Movements: Extraocular movements intact.      Conjunctiva/sclera: Conjunctivae normal.      Pupils: Pupils are equal, round, and reactive to light.   Cardiovascular:      Rate and Rhythm: Normal rate and regular rhythm.      Pulses: Normal pulses.      Heart sounds: Normal heart sounds.   Pulmonary:      Effort: Pulmonary effort is normal.      Breath sounds: Normal breath sounds.   Abdominal:      General: Abdomen is flat. Bowel sounds are normal.      Palpations: Abdomen is soft.   Musculoskeletal:         General: Normal range of motion.      Cervical back: Normal range of motion and neck supple.   Skin:     General: Skin is warm and dry.      Capillary Refill: Capillary refill takes less than 2 seconds.   Neurological:      General: No focal deficit present.      Mental Status: She is alert and oriented to person, place, and  time.   Psychiatric:         Mood and Affect: Mood normal.         Behavior: Behavior normal.         Thought Content: Thought content normal.         Judgment: Judgment normal.         ED Medications  Medications   lidocaine (LIDODERM) 5 % patch 1 patch (1 patch Topical Medication Applied 4/4/24 1543)   acetaminophen (TYLENOL) tablet 650 mg (650 mg Oral Given 4/4/24 1545)   ketorolac (TORADOL) injection 15 mg (15 mg Intramuscular Given 4/4/24 1545)       Diagnostic Studies  Results Reviewed       Procedure Component Value Units Date/Time    HS Troponin 0hr (reflex protocol) [[id number]]  (Normal) Collected: 04/04/24 1444    Lab Status: Final result Specimen: Blood from Arm, Left Updated: 04/04/24 1521     hs TnI 0hr <2 ng/L     Comprehensive metabolic panel [[id number]]  (Abnormal) Collected: 04/04/24 1444    Lab Status: Final result Specimen: Blood from Arm, Left Updated: 04/04/24 1512     Sodium 139 mmol/L      Potassium 3.4 mmol/L      Chloride 102 mmol/L      CO2 29 mmol/L      ANION GAP 8 mmol/L      BUN 11 mg/dL      Creatinine 0.63 mg/dL      Glucose 129 mg/dL      Calcium 9.1 mg/dL      AST 15 U/L      ALT 19 U/L      Alkaline Phosphatase 76 U/L      Total Protein 7.3 g/dL      Albumin 4.3 g/dL      Total Bilirubin 0.36 mg/dL      eGFR 102 ml/min/1.73sq m     Narrative:      National Kid[employer] Foundation guidelines for Chronic Kidney Disease (CKD):     Stage 1 with normal or high GFR (GFR > 90 mL/min/1.73 square meters)    Stage 2 Mild CKD (GFR = 60-89 mL/min/1.73 square meters)    Stage 3A Moderate CKD (GFR = 45-59 mL/min/1.73 square meters)    Stage 3B Moderate CKD (GFR = 30-44 mL/min/1.73 square meters)    Stage 4 Severe CKD (GFR = 15-29 mL/min/1.73 square meters)    Stage 5 End Stage CKD (GFR <15 mL/min/1.73 square meters)  Note: GFR calculation is accurate only with a steady state creatinine    CBC and differential [[id number]]  (Abnormal) Collected: 04/04/24 1444    Lab Status: Final result  Specimen: Blood from Arm, Left Updated: 04/04/24 1451     WBC 10.42 Thousand/uL      RBC 4.40 Million/uL      Hemoglobin 12.7 g/dL      Hematocrit 39.1 %      MCV 89 fL      MCH 28.9 pg      MCHC 32.5 g/dL      RDW 13.5 %      MPV 9.7 fL      Platelets 359 Thousands/uL      nRBC 0 /100 WBCs      Neutrophils Relative 49 %      Immature Grans % 0 %      Lymphocytes Relative 42 %      Monocytes Relative 7 %      Eosinophils Relative 1 %      Basophils Relative 1 %      Neutrophils Absolute 5.16 Thousands/µL      Absolute Immature Grans 0.03 Thousand/uL      Absolute Lymphocytes 4.33 Thousands/µL      Absolute Monocytes 0.74 Thousand/µL      Eosinophils Absolute 0.11 Thousand/µL      Basophils Absolute 0.05 Thousands/µL                    XR chest 1 view portable    (Results Pending)         Procedures  Procedures      ED Course  ED Course as of 04/04/24 1622   Thu Apr 04, 2024   1529 Procedure Note: EKG  Date/Time: 04/04/24 3:29 PM   Interpreted by: DIAZ ARREDONDO   Indications / Diagnosis: chest pain  ECG reviewed by me, the ED Provider: yes   The EKG demonstrates: normal EKG, normal sinus rhythm, unchanged from previous tracings  Rhythm: normal sinus  Intervals: normal intervals  Axis: normal axis  QRS/Blocks: normal QRS  ST Changes: No acute ST Changes, no STD/CHANDA.               HEART Risk Score      Flowsheet Row Most Recent Value   Heart Score Risk Calculator    History 1 Filed at: 04/04/2024 1531   ECG 0 Filed at: 04/04/2024 1531   Age 1 Filed at: 04/04/2024 1531   Risk Factors 0 Filed at: 04/04/2024 1531   Troponin 0 Filed at: 04/04/2024 1531   HEART Score 2 Filed at: 04/04/2024 1531                        SBIRT 20yo+      Flowsheet Row Most Recent Value   Initial Alcohol Screen: US AUDIT-C     1. How often do you have a drink containing alcohol? 0 Filed at: 04/04/2024 1441   Audit-C Score 0 Filed at: 04/04/2024 1441   FANY: How many times in the past year have you...    Used an illegal drug or used a  prescription medication for non-medical reasons? Never Filed at: 04/04/2024 1441                  Medical Decision Making  Patient is 53-year-old female presenting for chest pain.  DDx: Musculoskeletal chest pain, ACS rule out, dissection, pneumothorax, costochondritis  Based on patient presentation and physical exam findings, primary concern is for musculoskeletal chest pain I will obtain full cardiac workup to rule out ACS, dissection, pneumothorax.  Lab workup negative for any acute findings.  Will treat symptomatically with Tylenol, Toradol, Lidoderm patch.  Plan for discharge at this time return precautions.  Patient understands and agrees.  Ready for discharge.    Amount and/or Complexity of Data Reviewed  Labs: ordered.  Radiology: ordered.    Risk  OTC drugs.  Prescription drug management.          Disposition  Final diagnoses:   Chest pain     Time reflects when diagnosis was documented in both MDM as applicable and the Disposition within this note       Time User Action Codes Description Comment    4/4/2024  4:05 PM Omar Garcia [R07.9] Chest pain           ED Disposition       ED Disposition   Discharge    Condition   Stable    Date/Time   u Apr 4, 2024 1605    Comment   Lizette Chavez discharge to home/self care.                   Follow-up Information       Follow up With Specialties Details Why Contact Info Additional Information    [facility] Emergency Department Emergency Medicine Go to  If symptoms worsen [address] [place] [id number] [phone] [facility] Emergency Department, [address] [place] [address], [id number]   [phone]            Patient's Medications   Discharge Prescriptions    No medications on file         PDMP Review       None             ED Provider  Attending physically available and evaluated Lizette Chavez. I managed the patient along with the ED Attending.    Electronically Signed by            Omar Garcia MD  04/04/24 1560

## 2024-05-10 ENCOUNTER — TELEPHONE (OUTPATIENT)
Age: 54
End: 2024-05-10

## 2024-05-10 NOTE — TELEPHONE ENCOUNTER
Entered patients chart to verify PCP for Amb Referral to FM/IM.  Patient sees Dr. Varghese Flores at [facility]

## 2025-07-07 ENCOUNTER — TELEPHONE (OUTPATIENT)
Dept: INTERNAL MEDICINE CLINIC | Facility: CLINIC | Age: 55
End: 2025-07-07